# Patient Record
Sex: FEMALE | Race: WHITE | NOT HISPANIC OR LATINO
[De-identification: names, ages, dates, MRNs, and addresses within clinical notes are randomized per-mention and may not be internally consistent; named-entity substitution may affect disease eponyms.]

---

## 2020-09-24 ENCOUNTER — TRANSCRIPTION ENCOUNTER (OUTPATIENT)
Age: 45
End: 2020-09-24

## 2022-04-26 ENCOUNTER — APPOINTMENT (OUTPATIENT)
Dept: ORTHOPEDIC SURGERY | Facility: CLINIC | Age: 47
End: 2022-04-26
Payer: COMMERCIAL

## 2022-04-26 VITALS — BODY MASS INDEX: 29.23 KG/M2 | HEIGHT: 59 IN | WEIGHT: 145 LBS

## 2022-04-26 DIAGNOSIS — Z86.39 PERSONAL HISTORY OF OTHER ENDOCRINE, NUTRITIONAL AND METABOLIC DISEASE: ICD-10-CM

## 2022-04-26 DIAGNOSIS — Z78.9 OTHER SPECIFIED HEALTH STATUS: ICD-10-CM

## 2022-04-26 DIAGNOSIS — G56.00 CARPAL TUNNEL SYNDROME, UNSPECIFIED UPPER LIMB: ICD-10-CM

## 2022-04-26 PROBLEM — Z00.00 ENCOUNTER FOR PREVENTIVE HEALTH EXAMINATION: Status: ACTIVE | Noted: 2022-04-26

## 2022-04-26 PROCEDURE — 99214 OFFICE O/P EST MOD 30 MIN: CPT | Mod: 57

## 2022-04-26 NOTE — HISTORY OF PRESENT ILLNESS
[de-identified] : 46F, RHD, PMHX of Weightloss Sx presents with left arm pain/numbness & tingling. Patient reports has been on going for a long time minimum of a year. Worse at night, or with repetitive motion. Admits to trying Meloxicam - denies any OT/PT, bracing. Reports no relief with Meloxicam. Admits to entire hand going numb. Denies recent EMG. \par \par Drives a School Bus\par \par 4/26/22: f/u left shoulder. Reports PT is going well - was feeling better - today has a decent amount of pain. Admits to continuous numbness/tingling. No constitutional symptoms. Has EMG results.

## 2022-04-26 NOTE — ASSESSMENT
[FreeTextEntry1] : Right CTS - reviewed bilateral EMG/NCV results of right moderate CTS. Given patient's continued symptoms despite nonoperative management, patient indicated for right carpal tunnel release. We did have discussion of continuing nonoperative management; however, patient communicated symptoms were interfering with sleep and ADLs and she wanted to proceed with operative release. We discussed the risks, benefits and alternatives to carpal tunnel release including risk of neurovascular injury, wound dehiscence/infection, continued numbness, continued weakness, need for reoperation, DVT and medical complications associated with anesthesia. Discussed that decompression halts progression, but that improvement with existing sensory or motor deficits are not guaranteed to return. Patient understood this conversation and all questions were answered. She elected to proceed.\par \par Plan for right endoscopic carpal tunnel release under local with sedation. [facility].\par \par F/u for surgery, 10 days thereafter

## 2022-04-26 NOTE — IMAGING
[de-identified] : Right wrist with no swelling nor erythema. Able to make fist, oppose thumb to small finger and abduct fingers, no overt atrophy. +Phalen's (very quick), -tinel at carpal, -tinel at [place], -tinel at cubital. -froment, -wartenberg. Intact sensation in median and intact at superficial radial and intact in small and ulnar ring finger(normal at ulnar hand) prior to provocative testing. <2sec cap refill.

## 2024-01-15 ENCOUNTER — APPOINTMENT (OUTPATIENT)
Dept: ORTHOPEDIC SURGERY | Facility: CLINIC | Age: 49
End: 2024-01-15
Payer: COMMERCIAL

## 2024-01-15 VITALS — HEIGHT: 59 IN | WEIGHT: 145 LBS | BODY MASS INDEX: 29.23 KG/M2

## 2024-01-15 PROCEDURE — ZZZZZ: CPT

## 2024-01-15 NOTE — HISTORY OF PRESENT ILLNESS
[de-identified] : The patient is a 48 year old RIGHT hand dominant female who presents today complaining of BL shoulder, R worse than L.   Date of Injury/Onset: ~2020 Pain:    At Rest: 3/10  With Activity:  7/10  Mechanism of injury: Insidious This is NOT a Work Related Injury being treated under Worker's Compensation. This is NOT an athletic injury occurring associated with an interscholastic or organized sports team. Quality of symptoms: paresthesia, burning, throbbing  Improves with: Stretching Worse with: Driving Prior treatment: PT, Medical massage, Meloxicam Prior Imaging: MRI ([facility], 2023) Out of work/sport: _, since _ School/Sport/Position/Occupation:  Additional Information: None

## 2024-01-15 NOTE — DATA REVIEWED
[FreeTextEntry1] : 1/15/24 [facility] Left shoulder xrays: multiple calcifications in distal supraspinatus    MRI of the right shoulder done at  on Jan 4th, 2023 shows:  1. Supraspinatatus tendinosis  2. Degenerative changes at hte acromioclavicular joint.   MRI of the cervical spine done at  on Jan 4th, 2023 shows:  1. Straightnening of the cervical spine, possibly secondary to muscle spasm/strain.  2. Multilevel degenerative disc disease without significant spinal canal stenosis or neural foraminal narrowing. 3. Normal cervical spinal cord signal intensity.  4. Interval spinal fusion with discectomies from C5 to C7.

## 2024-01-15 NOTE — PHYSICAL EXAM
[de-identified] : GENERAL APPEARANCE: Well nourished and hydrated, pleasant, alert, and oriented x 3. Appears their stated age.  HEENT: Normocephalic, extraocular eye motion intact. Nasal septum midline. Oral cavity clear. External auditory canal clear.  RESPIRATORY: Breath sounds clear and audible in all lobes. No wheezing, No accessory muscle use. CARDIOVASCULAR: No apparent abnormalities. No lower leg edema. No varicosities. Pedal pulses are palpable. NEUROLOGIC: Sensation is normal, no muscle weakness in the upper or lower extremities. DERMATOLOGIC: No apparent skin lesions, moist, warm, no rash. SPINE: Cervical spine appears normal and moves freely; thoracic spine appears normal and moves freely; lumbosacral spine appears normal and moves freely, normal, nontender. MUSCULOSKELETAL: Hands, wrists, and elbows are normal and move freely, shoulders are normal and move freely. GENERAL APPEARANCE: Well nourished and hydrated, pleasant, alert, and oriented x 3. Appears their stated age.  HEENT: Normocephalic, extraocular eye motion intact. Nasal septum midline. Oral cavity clear. External auditory canal clear.  RESPIRATORY: Breath sounds clear and audible in all lobes. No wheezing, No accessory muscle use. CARDIOVASCULAR: No apparent abnormalities. No lower leg edema. No varicosities. Pedal pulses are palpable. NEUROLOGIC: Sensation is normal, no muscle weakness in the upper or lower extremities. DERMATOLOGIC: No apparent skin lesions, moist, warm, no rash. SPINE: Cervical spine appears normal and moves freely; thoracic spine appears normal and moves freely; lumbosacral spine appears normal and moves freely, normal, nontender. MUSCULOSKELETAL: Hands, wrists, and elbows are normal and move freely, shoulders are normal and move freely..  right shoulder exam is unremarkable  Left shoulder exam shows impingement of shoulder, +Misbah

## 2024-01-15 NOTE — DISCUSSION/SUMMARY
[de-identified] : 47 y/o F pt presents with bilateral shoulder pain.  R shoulder calcific tendonitis Regarding the left shoulder, we provided the pt with a right shoulder cortisone injection which she tolerated well.  ** activity modification and ice therapy, home exercise packet ** F/u in 6 wks, will pursue MRI if not improved  Regarding the right shoulder **F/u with pain managment.  Most likley C-spine related.    Left Shoulder Subacromial steroid injection procedure note: Patient Identification Name/: Verbal with patient and/or family Procedure Verification: Procedure confirmed with patient or family/designee Consent for procedure: Verbal Consent Given Relevant documentation completed, reviewed, and signed Clinical indications for procedure confirmed Time-out with all members of procedure team immediately prior to procedure: Correct patient identified. Agreement on procedure. Correct side and site.  SHOULDER SUBACROMIAL INJECTION - LEFT  After verbal consent and identification of the correct patient and correct site, the left posterolateral shoulder was prepped using alcohol swabs and betadine. This was allowed time to air dry. A mixture of 1cc Celestone (6mg/ml), 3cc Lidocaine 1% was injected into the left shoulder subacromial space using a sterile 22G needle after ethyl chloride spray for skin anesthesia. The patient tolerated the procedure well. A sterile dressing was placed. After-care instructions were provided and included instructions to ice the area and to call if redness, pain, or fever develop. ---------------------------  Home Exercise The patient is instructed on a home exercise program.  Binta Younger Acting as a Scribe for Dr. Amelia CHENG, Binta Younger, attest that this documentation has been prepared under the direction and in the presence of Provider Seng Cisneros MD.  Activity Modification The patient was advised to modify their activities.  Dx / Natural History The patient was advised of the diagnosis. The natural history of the pathology was explained in full to the patient in layman's terms. Several different treatment options were discussed and explained in full to the patient including the risks and benefits of both surgical and non-surgical treatments. All questions and concerns were answered.  Pain Guide Activities The patient was advised to let pain guide the gradual advancement of activities.  ERIKA CHENG explained to the patient that rest, ice, compression, and elevation would benefit them. They may return to activity after follow-up or when they no longer have any pain.  The patient's current medication management of their orthopedic diagnosis was reviewed today: (1) We discussed a comprehensive treatment plan that included possible pharmaceutical management involving the use of prescription strength medications including but not limited to options such as oral Naprosyn 500mg BID, once daily Meloxicam 15 mg, or 500-650 mg Tylenol versus over the counter oral medications and topical prescription NSAID Pennsaid vs over the counter Voltaren gel. (2) There is a moderate risk of morbidity with further treatment, especially from use of prescription strength medications and possible side effects of these medications which include upset stomach with oral medications, skin reactions to topical medications and cardiac/renal issues with long term use. (3) I recommended that the patient follow-up with their medical physician to discuss any significant specific potential issues with long term medication use such as interactions with current medications or with exacerbation of underlying medical comorbidities. (4) The benefits and risks associated with use of injectable, oral or topical, prescription and over the counter anti-inflammatory medications were discussed with the patient. The patient voiced understanding of the risks including but not limited to bleeding, stroke, kidney dysfunction, heart disease, and were referred to the black box warning label for further information.

## 2024-03-29 ENCOUNTER — APPOINTMENT (OUTPATIENT)
Dept: ORTHOPEDIC SURGERY | Facility: CLINIC | Age: 49
End: 2024-03-29

## 2024-04-01 ENCOUNTER — APPOINTMENT (OUTPATIENT)
Dept: ORTHOPEDIC SURGERY | Facility: CLINIC | Age: 49
End: 2024-04-01
Payer: COMMERCIAL

## 2024-04-01 VITALS — HEIGHT: 59 IN | WEIGHT: 145 LBS | BODY MASS INDEX: 29.23 KG/M2

## 2024-04-01 DIAGNOSIS — M65.20 CALCIFIC TENDINITIS, UNSPECIFIED SITE: ICD-10-CM

## 2024-04-01 PROCEDURE — 99213 OFFICE O/P EST LOW 20 MIN: CPT

## 2024-04-01 PROCEDURE — 73030 X-RAY EXAM OF SHOULDER: CPT | Mod: RT

## 2024-04-01 NOTE — DISCUSSION/SUMMARY
[de-identified] : 49 y/o F pt presents with bilateral shoulder pain. Not improved despite cortisone injection, trigger point injections, PT. R shoulder MRI eval RTC.  L shoulder MRI eval RTC. Fu with Dr Cisneros after MRI.   Discussed EMG in the future.   ------------------------------------------------------  Home Exercise The patient is instructed on a home exercise program.  Activity Modification The patient was advised to modify their activities.  Dx / Natural History The patient was advised of the diagnosis. The natural history of the pathology was explained in full to the patient in layman's terms. Several different treatment options were discussed and explained in full to the patient including the risks and benefits of both surgical and non-surgical treatments. All questions and concerns were answered.  Pain Guide Activities The patient was advised to let pain guide the gradual advancement of activities.  RICE  I explained to the patient that rest, ice, compression, and elevation would benefit them.  They may return to activity after follow-up or when they no longer have any pain.    The patient's current medication management of their orthopedic diagnosis was reviewed today: (1) We discussed a comprehensive treatment plan that included possible pharmaceutical management involving the use of prescription strength medications including but not limited to options such as oral Naprosyn 500mg BID, once daily Meloxicam 15 mg, or 500-650 mg Tylenol versus over the counter oral medications and topical prescription NSAID Pennsaid vs over the counter Voltaren gel. (2) There is a moderate risk of morbidity with further treatment, especially from use of prescription strength medications and possible side effects of these medications which include upset stomach with oral medications, skin reactions to topical medications and cardiac/renal issues with long term use. (3) I recommended that the patient follow-up with their medical physician to discuss any significant specific potential issues with long term medication use such as interactions with current medications or with exacerbation of underlying medical comorbidities. (4) The benefits and risks associated with use of injectable, oral or topical, prescription and over the counter anti-inflammatory medications were discussed with the patient. The patient voiced understanding of the risks including but not limited to bleeding, stroke, kidney dysfunction, heart disease, and were referred to the black box warning label for further information.

## 2024-04-01 NOTE — DATA REVIEWED
[FreeTextEntry1] : 1/15/24 [facility] Left shoulder xrays: multiple calcifications in distal supraspinatus    4/1/24 [facility] Right shoulder xrays: rotator cuff calcification    MRI of the right shoulder done at  on Jan 4th, 2023 shows:  1. Supraspinatatus tendinosis  2. Degenerative changes at hte acromioclavicular joint.   MRI of the cervical spine done at  on Jan 4th, 2023 shows:  1. Straightnening of the cervical spine, possibly secondary to muscle spasm/strain.  2. Multilevel degenerative disc disease without significant spinal canal stenosis or neural foraminal narrowing. 3. Normal cervical spinal cord signal intensity.  4. Interval spinal fusion with discectomies from C5 to C7.

## 2024-04-01 NOTE — HISTORY OF PRESENT ILLNESS
[de-identified] : The patient is a 48 year old RIGHT hand dominant female who presents today complaining of BL shoulder pain.   Date of Injury/Onset: ~2020 Pain:    At Rest: 3/10  With Activity:  7/10  Mechanism of injury: Insidious This is NOT a Work Related Injury being treated under Worker's Compensation. This is NOT an athletic injury occurring associated with an interscholastic or organized sports team. Quality of symptoms: paresthesia, burning, throbbing  Improves with: Stretching, Physical therapy  Worse with: Driving Treatment/Imaging since last visit: [facility]  Out of work/sport: _, since _ School/Sport/Position/Occupation:  Changes since last visit: Patient noted little to no relief from Trigger point or CSI injections.  Additional Information: None

## 2024-04-01 NOTE — PHYSICAL EXAM
[de-identified] : Neurologic: normal coordination, normal DTR UE/LE , normal sensation, normal mood and affect, orientated and able to communicate. Constitutional: well developed and well nourished.   Right shoulder: +Impingement test +Neer test +Win sign 4-/5 Supraspinatus Strength stable  nvi  Left shoulder: +Impingement test +Neer test +Crawley test +Win sign 4-/5 Supraspinatus Strength stable  nvi

## 2024-04-02 ENCOUNTER — APPOINTMENT (OUTPATIENT)
Dept: MRI IMAGING | Facility: CLINIC | Age: 49
End: 2024-04-02
Payer: COMMERCIAL

## 2024-04-02 PROCEDURE — 73221 MRI JOINT UPR EXTREM W/O DYE: CPT | Mod: LT

## 2024-04-08 ENCOUNTER — APPOINTMENT (OUTPATIENT)
Dept: ORTHOPEDIC SURGERY | Facility: CLINIC | Age: 49
End: 2024-04-08
Payer: COMMERCIAL

## 2024-04-08 VITALS — BODY MASS INDEX: 29.23 KG/M2 | HEIGHT: 59 IN | WEIGHT: 145 LBS

## 2024-04-08 DIAGNOSIS — M25.512 PAIN IN LEFT SHOULDER: ICD-10-CM

## 2024-04-08 DIAGNOSIS — M25.511 PAIN IN RIGHT SHOULDER: ICD-10-CM

## 2024-04-08 DIAGNOSIS — M79.18 MYALGIA, OTHER SITE: ICD-10-CM

## 2024-04-08 PROCEDURE — ZZZZZ: CPT

## 2024-04-08 NOTE — HISTORY OF PRESENT ILLNESS
[de-identified] : The patient is a 48 year old RIGHT hand dominant female who presents today complaining of BL shoulder pain.   Date of Injury/Onset: ~2020 Pain:    At Rest: 3/10  With Activity:  7/10  Mechanism of injury: Insidious This is NOT a Work Related Injury being treated under Worker's Compensation. This is NOT an athletic injury occurring associated with an interscholastic or organized sports team. Quality of symptoms: paresthesia, burning, throbbing  Improves with: Stretching, Physical therapy  Worse with: Driving Treatment/Imaging since last visit: BL Shoulder MRI ([facility]) Out of work/sport: _, since _ School/Sport/Position/Occupation:  Changes since last visit: MRI  Additional Information: None

## 2024-04-08 NOTE — DISCUSSION/SUMMARY
[de-identified] : Reviewed all images with patient. Rx zp  barbotage procedure- Dr Romero. Follow up with neck/spine services. Patient will follow up in 6 weeks.    ----------------------------------------------- Home Exercise The patient is instructed on a home exercise program.  FABRICIO GIBSON Acting as a Scribe for Dr. Amelia CHENG, Fabricio Gibson, attest that this documentation has been prepared under the direction and in the presence of Provider Seng Cisneros MD.  Activity Modification The patient was advised to modify their activities.  Dx / Natural History The patient was advised of the diagnosis.  The natural history of the pathology was explained in full to the patient in layman's terms.  Several different treatment options were discussed and explained in full to the patient including the risks and benefits of both surgical and non-surgical treatments.  All questions and concerns were answered.  Pain Guide Activities The patient was advised to let pain guide the gradual advancement of activities.  RICE I explained to the patient that rest, ice, compression, and elevation would benefit them.  They may return to activity after follow-up or when they no longer have any pain.  The patient's current medication management of their orthopedic diagnosis was reviewed today: (1) We discussed a comprehensive treatment plan that included possible pharmaceutical management involving the use of prescription strength medications including but not limited to options such as oral Naprosyn 500mg BID, once daily Meloxicam 15 mg, or 500-650 mg Tylenol versus over the counter oral medications and topical prescription NSAID Pennsaid vs over the counter Voltaren gel. (2) There is a moderate risk of morbidity with further treatment, especially from use of prescription strength medications and possible side effects of these medications which include upset stomach with oral medications, skin reactions to topical medications and cardiac/renal issues with long term use. (3) I recommended that the patient follow-up with their medical physician to discuss any significant specific potential issues with long term medication use such as interactions with current medications or with exacerbation of underlying medical comorbidities. (4) The benefits and risks associated with use of injectable, oral or topical, prescription and over the counter anti-inflammatory medications were discussed with the patient. The patient voiced understanding of the risks including but not limited to bleeding, stroke, kidney dysfunction, heart disease, and were referred to the black box warning label for further information.

## 2024-04-08 NOTE — PHYSICAL EXAM
[de-identified] : Neurologic: normal coordination, normal DTR UE/LE , normal sensation, normal mood and affect, orientated and able to communicate. Constitutional: well developed and well nourished.   Right shoulder: +Impingement test +Neer test +Win sign 4-/5 Supraspinatus Strength stable  nvi  Left shoulder: +Impingement test +Neer test +Crawley test +Win sign 4-/5 Supraspinatus Strength stable  nvi

## 2024-04-08 NOTE — DATA REVIEWED
[FreeTextEntry1] : 04/02/24 [facility] of the right shoulder: 1.Mild distal infraspinatus calcific tendinitis posteriorly with surrounding bursitis and mild supraspinatus calcific tendinitis without tendon tear. 2.mild to moderate Ac joint arthrosis. 3.Mild superior subscapularis tendinopathy.   04/02/24 [facility] of the Left Shoulder: 1.Distal supraspinatus and infraspinatus calcific tendinopathy/tendinitis with  slight surrounding bursitis .without evidence of tendon tear or retraction. 2.Mild ac joint arthrosis

## 2024-04-30 ENCOUNTER — RESULT REVIEW (OUTPATIENT)
Age: 49
End: 2024-04-30

## 2024-09-16 ENCOUNTER — APPOINTMENT (OUTPATIENT)
Dept: ORTHOPEDIC SURGERY | Facility: CLINIC | Age: 49
End: 2024-09-16

## 2024-09-16 VITALS — BODY MASS INDEX: 29.23 KG/M2 | HEIGHT: 59 IN | WEIGHT: 145 LBS

## 2024-09-16 DIAGNOSIS — M25.511 PAIN IN RIGHT SHOULDER: ICD-10-CM

## 2024-09-16 DIAGNOSIS — M79.18 MYALGIA, OTHER SITE: ICD-10-CM

## 2024-09-16 DIAGNOSIS — M25.512 PAIN IN LEFT SHOULDER: ICD-10-CM

## 2024-09-16 DIAGNOSIS — M65.20 CALCIFIC TENDINITIS, UNSPECIFIED SITE: ICD-10-CM

## 2024-09-16 PROCEDURE — 99203 OFFICE O/P NEW LOW 30 MIN: CPT | Mod: 25

## 2024-09-16 PROCEDURE — 73030 X-RAY EXAM OF SHOULDER: CPT | Mod: LT

## 2024-09-17 NOTE — DATA REVIEWED
[FreeTextEntry1] : 09/16/24 [facility] Examination of the LEFT SHOULDER: 3+ views: 70 percent improvement in calcific body.  04/02/24 [facility] of the right shoulder: 1.Mild distal infraspinatus calcific tendinitis posteriorly with surrounding bursitis and mild supraspinatus calcific tendinitis without tendon tear. 2.mild to moderate Ac joint arthrosis. 3.Mild superior subscapularis tendinopathy.   04/02/24 [facility] of the Left Shoulder: 1.Distal supraspinatus and infraspinatus calcific tendinopathy/tendinitis with  slight surrounding bursitis .without evidence of tendon tear or retraction. 2.Mild ac joint arthrosis

## 2024-09-17 NOTE — DISCUSSION/SUMMARY
[de-identified] :  Reviewed all X-ray images with patient, interpretation was provided.---70 percent improvement in calcific body.  reviewed 24 shoulder MRI again in office today w AC arthrosis and calcific body Patient reports improvement in symptoms following barbotage; however, symptoms have now recurred. Discussed all treatment option with patient, patient wants to follow through with csi injection today. Patient will continue physical therapy. rx naproxen Patient will follow up as needed.    RB&A to corticosteroid injection discussed. All questions were answered. Patient wishes to move forward with injection today.  Left Shoulder Subacromial steroid injection procedure note ULTRASOUND GUIDED: Patient Identification Name/: Verbal with patient and/or family   Procedure Verification: Procedure confirmed with patient or family/designee Consent for procedure: Verbal Consent Given Relevant documentation completed, reviewed, and signed Clinical indications for procedure confirmed  Time-out with all members of procedure team immediately prior to procedure: Correct patient identified. Agreement on procedure. Correct side and site.  SHOULDER SUBACROMIAL INJECTION - LEFT After verbal consent and identification of the correct patient and correct site, the LEFT posterolateral shoulder was prepped using alcohol swabs and betadine. This was allowed time to air dry. A mixture of Kenalog, Bupivacaine was injected into the left shoulder subacromial space using a sterile 22G needle after ethyl chloride spray for skin anesthesia. The patient tolerated the procedure well.  A sterile dressing was placed.  After-care instructions were provided and included instructions to ice the area and to call if redness        ----------------------------------------------- Home Exercise The patient is instructed on a home exercise program.  FABRICIO GIBSON Acting as a Scribe for Dr. Amelia CHENG, Fabricio Gibson, attest that this documentation has been prepared under the direction and in the presence of Provider Seng Cisneros MD.  Activity Modification The patient was advised to modify their activities.  Dx / Natural History The patient was advised of the diagnosis.  The natural history of the pathology was explained in full to the patient in layman's terms.  Several different treatment options were discussed and explained in full to the patient including the risks and benefits of both surgical and non-surgical treatments.  All questions and concerns were answered.  Pain Guide Activities The patient was advised to let pain guide the gradual advancement of activities.  RICE I explained to the patient that rest, ice, compression, and elevation would benefit them.  They may return to activity after follow-up or when they no longer have any pain.  The patient's current medication management of their orthopedic diagnosis was reviewed today: (1) We discussed a comprehensive treatment plan that included possible pharmaceutical management involving the use of prescription strength medications including but not limited to options such as oral Naprosyn 500mg BID, once daily Meloxicam 15 mg, or 500-650 mg Tylenol versus over the counter oral medications and topical prescription NSAID Pennsaid vs over the counter Voltaren gel. (2) There is a moderate risk of morbidity with further treatment, especially from use of prescription strength medications and possible side effects of these medications which include upset stomach with oral medications, skin reactions to topical medications and cardiac/renal issues with long term use. (3) I recommended that the patient follow-up with their medical physician to discuss any significant specific potential issues with long term medication use such as interactions with current medications or with exacerbation of underlying medical comorbidities. (4) The benefits and risks associated with use of injectable, oral or topical, prescription and over the counter anti-inflammatory medications were discussed with the patient. The patient voiced understanding of the risks including but not limited to bleeding, stroke, kidney dysfunction, heart disease, and were referred to the black box warning label for further information.   no...

## 2024-09-17 NOTE — HISTORY OF PRESENT ILLNESS
[de-identified] : The patient is a 48 year old RIGHT hand dominant female who presents today complaining of BL shoulder pain.   Date of Injury/Onset: ~2020 Pain:    At Rest: 4/10  With Activity:  7/10  Mechanism of injury: Insidious This is NOT a Work Related Injury being treated under Worker's Compensation. This is NOT an athletic injury occurring associated with an interscholastic or organized sports team. Quality of symptoms: paresthesia, burning, throbbing  Improves with: Stretching, Physical therapy  Worse with: Driving, lifting Treatment/Imaging since last visit: BL Shoulder MRI ([facility]) Out of work/sport: _, since _ School/Sport/Position/Occupation:  Changes since last visit: MRI  Additional Information: None

## 2024-09-17 NOTE — DISCUSSION/SUMMARY
[de-identified] :  Reviewed all X-ray images with patient, interpretation was provided.---70 percent improvement in calcific body.  reviewed 24 shoulder MRI again in office today w AC arthrosis and calcific body Patient reports improvement in symptoms following barbotage; however, symptoms have now recurred. Discussed all treatment option with patient, patient wants to follow through with csi injection today. Patient will continue physical therapy. rx naproxen Patient will follow up as needed.    RB&A to corticosteroid injection discussed. All questions were answered. Patient wishes to move forward with injection today.  Left Shoulder Subacromial steroid injection procedure note ULTRASOUND GUIDED: Patient Identification Name/: Verbal with patient and/or family   Procedure Verification: Procedure confirmed with patient or family/designee Consent for procedure: Verbal Consent Given Relevant documentation completed, reviewed, and signed Clinical indications for procedure confirmed  Time-out with all members of procedure team immediately prior to procedure: Correct patient identified. Agreement on procedure. Correct side and site.  SHOULDER SUBACROMIAL INJECTION - LEFT After verbal consent and identification of the correct patient and correct site, the LEFT posterolateral shoulder was prepped using alcohol swabs and betadine. This was allowed time to air dry. A mixture of Kenalog, Bupivacaine was injected into the left shoulder subacromial space using a sterile 22G needle after ethyl chloride spray for skin anesthesia. The patient tolerated the procedure well.  A sterile dressing was placed.  After-care instructions were provided and included instructions to ice the area and to call if redness        ----------------------------------------------- Home Exercise The patient is instructed on a home exercise program.  FABRICIO GIBSON Acting as a Scribe for Dr. Amelia CHENG, Fabricio Gibson, attest that this documentation has been prepared under the direction and in the presence of Provider Seng Cisneros MD.  Activity Modification The patient was advised to modify their activities.  Dx / Natural History The patient was advised of the diagnosis.  The natural history of the pathology was explained in full to the patient in layman's terms.  Several different treatment options were discussed and explained in full to the patient including the risks and benefits of both surgical and non-surgical treatments.  All questions and concerns were answered.  Pain Guide Activities The patient was advised to let pain guide the gradual advancement of activities.  RICE I explained to the patient that rest, ice, compression, and elevation would benefit them.  They may return to activity after follow-up or when they no longer have any pain.  The patient's current medication management of their orthopedic diagnosis was reviewed today: (1) We discussed a comprehensive treatment plan that included possible pharmaceutical management involving the use of prescription strength medications including but not limited to options such as oral Naprosyn 500mg BID, once daily Meloxicam 15 mg, or 500-650 mg Tylenol versus over the counter oral medications and topical prescription NSAID Pennsaid vs over the counter Voltaren gel. (2) There is a moderate risk of morbidity with further treatment, especially from use of prescription strength medications and possible side effects of these medications which include upset stomach with oral medications, skin reactions to topical medications and cardiac/renal issues with long term use. (3) I recommended that the patient follow-up with their medical physician to discuss any significant specific potential issues with long term medication use such as interactions with current medications or with exacerbation of underlying medical comorbidities. (4) The benefits and risks associated with use of injectable, oral or topical, prescription and over the counter anti-inflammatory medications were discussed with the patient. The patient voiced understanding of the risks including but not limited to bleeding, stroke, kidney dysfunction, heart disease, and were referred to the black box warning label for further information.

## 2024-09-17 NOTE — PHYSICAL EXAM
[de-identified] : Neurologic: normal coordination, normal DTR UE/LE , normal sensation, normal mood and affect, orientated and able to communicate. Constitutional: well developed and well nourished.   Right shoulder: +Impingement test +Neer test +Wni sign 4-/5 Supraspinatus Strength stable  nvi  Left shoulder: +Impingement test +Neer test +Crawley test +Win sign 4-/5 Supraspinatus Strength stable  nvi

## 2024-09-17 NOTE — PHYSICAL EXAM
[de-identified] : Neurologic: normal coordination, normal DTR UE/LE , normal sensation, normal mood and affect, orientated and able to communicate. Constitutional: well developed and well nourished.   Right shoulder: +Impingement test +Neer test +Win sign 4-/5 Supraspinatus Strength stable  nvi  Left shoulder: +Impingement test +Neer test +Crawley test +Win sign 4-/5 Supraspinatus Strength stable  nvi

## 2024-09-17 NOTE — HISTORY OF PRESENT ILLNESS
[de-identified] : The patient is a 48 year old RIGHT hand dominant female who presents today complaining of BL shoulder pain.   Date of Injury/Onset: ~2020 Pain:    At Rest: 4/10  With Activity:  7/10  Mechanism of injury: Insidious This is NOT a Work Related Injury being treated under Worker's Compensation. This is NOT an athletic injury occurring associated with an interscholastic or organized sports team. Quality of symptoms: paresthesia, burning, throbbing  Improves with: Stretching, Physical therapy  Worse with: Driving, lifting Treatment/Imaging since last visit: BL Shoulder MRI ([facility]) Out of work/sport: _, since _ School/Sport/Position/Occupation:  Changes since last visit: MRI  Additional Information: None

## 2025-01-28 ENCOUNTER — APPOINTMENT (OUTPATIENT)
Dept: ORTHOPEDIC SURGERY | Facility: CLINIC | Age: 50
End: 2025-01-28
Payer: COMMERCIAL

## 2025-01-28 DIAGNOSIS — M25.512 PAIN IN LEFT SHOULDER: ICD-10-CM

## 2025-01-28 DIAGNOSIS — S49.92XD UNSPECIFIED INJURY OF LEFT SHOULDER AND UPPER ARM, SUBSEQUENT ENCOUNTER: ICD-10-CM

## 2025-01-28 DIAGNOSIS — M79.18 MYALGIA, OTHER SITE: ICD-10-CM

## 2025-01-28 DIAGNOSIS — M24.812 OTHER SPECIFIC JOINT DERANGEMENTS OF LEFT SHOULDER, NOT ELSEWHERE CLASSIFIED: ICD-10-CM

## 2025-01-28 PROCEDURE — 73030 X-RAY EXAM OF SHOULDER: CPT | Mod: LT

## 2025-01-28 PROCEDURE — 99214 OFFICE O/P EST MOD 30 MIN: CPT | Mod: 25

## 2025-01-30 ENCOUNTER — APPOINTMENT (OUTPATIENT)
Dept: MRI IMAGING | Facility: CLINIC | Age: 50
End: 2025-01-30
Payer: COMMERCIAL

## 2025-01-30 PROCEDURE — 72148 MRI LUMBAR SPINE W/O DYE: CPT

## 2025-01-31 ENCOUNTER — TRANSCRIPTION ENCOUNTER (OUTPATIENT)
Age: 50
End: 2025-01-31

## 2025-02-11 ENCOUNTER — APPOINTMENT (OUTPATIENT)
Dept: ORTHOPEDIC SURGERY | Facility: CLINIC | Age: 50
End: 2025-02-11
Payer: COMMERCIAL

## 2025-02-11 VITALS — WEIGHT: 115 LBS | BODY MASS INDEX: 23.18 KG/M2 | HEIGHT: 59 IN

## 2025-02-11 DIAGNOSIS — M25.512 PAIN IN LEFT SHOULDER: ICD-10-CM

## 2025-02-11 DIAGNOSIS — M24.812 OTHER SPECIFIC JOINT DERANGEMENTS OF LEFT SHOULDER, NOT ELSEWHERE CLASSIFIED: ICD-10-CM

## 2025-02-11 DIAGNOSIS — M79.18 MYALGIA, OTHER SITE: ICD-10-CM

## 2025-02-11 PROCEDURE — 99214 OFFICE O/P EST MOD 30 MIN: CPT

## 2025-07-11 ENCOUNTER — APPOINTMENT (OUTPATIENT)
Dept: ORTHOPEDIC SURGERY | Facility: CLINIC | Age: 50
End: 2025-07-11

## 2025-07-11 VITALS — WEIGHT: 115 LBS | HEIGHT: 59 IN | BODY MASS INDEX: 23.18 KG/M2

## 2025-07-11 PROBLEM — M79.2 RADICULAR PAIN OF RIGHT UPPER EXTREMITY: Status: ACTIVE | Noted: 2025-07-11

## 2025-07-11 PROCEDURE — 99203 OFFICE O/P NEW LOW 30 MIN: CPT

## 2025-07-11 PROCEDURE — 99213 OFFICE O/P EST LOW 20 MIN: CPT

## 2025-08-27 ENCOUNTER — APPOINTMENT (OUTPATIENT)
Dept: ORTHOPEDIC SURGERY | Facility: CLINIC | Age: 50
End: 2025-08-27
Payer: COMMERCIAL

## 2025-08-27 VITALS — BODY MASS INDEX: 23.18 KG/M2 | WEIGHT: 115 LBS | HEIGHT: 59 IN

## 2025-08-27 DIAGNOSIS — M50.20 OTHER CERVICAL DISC DISPLACEMENT, UNSPECIFIED CERVICAL REGION: ICD-10-CM

## 2025-08-27 DIAGNOSIS — Z98.1 ARTHRODESIS STATUS: ICD-10-CM

## 2025-08-27 PROCEDURE — 99213 OFFICE O/P EST LOW 20 MIN: CPT

## 2025-08-27 RX ORDER — MELOXICAM 15 MG/1
15 TABLET ORAL
Refills: 0 | Status: ACTIVE | COMMUNITY

## 2025-08-27 RX ORDER — TIZANIDINE 4 MG/1
TABLET ORAL
Refills: 0 | Status: ACTIVE | COMMUNITY

## 2025-08-27 RX ORDER — TIRZEPATIDE 12.5 MG/.5ML
12.5 INJECTION, SOLUTION SUBCUTANEOUS
Refills: 0 | Status: ACTIVE | COMMUNITY

## 2025-09-19 ENCOUNTER — APPOINTMENT (OUTPATIENT)
Dept: PAIN MANAGEMENT | Facility: CLINIC | Age: 50
End: 2025-09-19